# Patient Record
Sex: FEMALE | Race: WHITE | HISPANIC OR LATINO | URBAN - METROPOLITAN AREA
[De-identification: names, ages, dates, MRNs, and addresses within clinical notes are randomized per-mention and may not be internally consistent; named-entity substitution may affect disease eponyms.]

---

## 2017-10-11 ENCOUNTER — EMERGENCY (EMERGENCY)
Facility: HOSPITAL | Age: 27
LOS: 1 days | Discharge: HOME | End: 2017-10-11
Attending: EMERGENCY MEDICINE | Admitting: EMERGENCY MEDICINE
Payer: COMMERCIAL

## 2017-10-11 VITALS
HEART RATE: 67 BPM | SYSTOLIC BLOOD PRESSURE: 98 MMHG | OXYGEN SATURATION: 98 % | TEMPERATURE: 98 F | RESPIRATION RATE: 18 BRPM | DIASTOLIC BLOOD PRESSURE: 64 MMHG

## 2017-10-11 VITALS
HEART RATE: 84 BPM | RESPIRATION RATE: 20 BRPM | OXYGEN SATURATION: 99 % | DIASTOLIC BLOOD PRESSURE: 83 MMHG | TEMPERATURE: 99 F | SYSTOLIC BLOOD PRESSURE: 116 MMHG

## 2017-10-11 LAB
ALBUMIN SERPL ELPH-MCNC: 4.4 G/DL — SIGNIFICANT CHANGE UP (ref 3.3–5)
ALP SERPL-CCNC: 92 U/L — SIGNIFICANT CHANGE UP (ref 40–120)
ALT FLD-CCNC: 21 U/L RC — SIGNIFICANT CHANGE UP (ref 10–45)
ANION GAP SERPL CALC-SCNC: 15 MMOL/L — SIGNIFICANT CHANGE UP (ref 5–17)
APPEARANCE UR: CLEAR — SIGNIFICANT CHANGE UP
AST SERPL-CCNC: 19 U/L — SIGNIFICANT CHANGE UP (ref 10–40)
BASOPHILS # BLD AUTO: 0.1 K/UL — SIGNIFICANT CHANGE UP (ref 0–0.2)
BASOPHILS NFR BLD AUTO: 0.7 % — SIGNIFICANT CHANGE UP (ref 0–2)
BILIRUB SERPL-MCNC: 0.7 MG/DL — SIGNIFICANT CHANGE UP (ref 0.2–1.2)
BILIRUB UR-MCNC: NEGATIVE — SIGNIFICANT CHANGE UP
BUN SERPL-MCNC: 16 MG/DL — SIGNIFICANT CHANGE UP (ref 7–23)
CALCIUM SERPL-MCNC: 9.4 MG/DL — SIGNIFICANT CHANGE UP (ref 8.4–10.5)
CHLORIDE SERPL-SCNC: 104 MMOL/L — SIGNIFICANT CHANGE UP (ref 96–108)
CO2 SERPL-SCNC: 23 MMOL/L — SIGNIFICANT CHANGE UP (ref 22–31)
COLOR SPEC: YELLOW — SIGNIFICANT CHANGE UP
CREAT SERPL-MCNC: 0.76 MG/DL — SIGNIFICANT CHANGE UP (ref 0.5–1.3)
DIFF PNL FLD: ABNORMAL
EOSINOPHIL # BLD AUTO: 0.1 K/UL — SIGNIFICANT CHANGE UP (ref 0–0.5)
EOSINOPHIL NFR BLD AUTO: 1.2 % — SIGNIFICANT CHANGE UP (ref 0–6)
EPI CELLS # UR: SIGNIFICANT CHANGE UP /HPF
GLUCOSE SERPL-MCNC: 120 MG/DL — HIGH (ref 70–99)
GLUCOSE UR QL: NEGATIVE — SIGNIFICANT CHANGE UP
HCT VFR BLD CALC: 45 % — SIGNIFICANT CHANGE UP (ref 34.5–45)
HGB BLD-MCNC: 15.6 G/DL — HIGH (ref 11.5–15.5)
KETONES UR-MCNC: NEGATIVE — SIGNIFICANT CHANGE UP
LEUKOCYTE ESTERASE UR-ACNC: NEGATIVE — SIGNIFICANT CHANGE UP
LYMPHOCYTES # BLD AUTO: 1.6 K/UL — SIGNIFICANT CHANGE UP (ref 1–3.3)
LYMPHOCYTES # BLD AUTO: 14.2 % — SIGNIFICANT CHANGE UP (ref 13–44)
MCHC RBC-ENTMCNC: 32.8 PG — SIGNIFICANT CHANGE UP (ref 27–34)
MCHC RBC-ENTMCNC: 34.6 GM/DL — SIGNIFICANT CHANGE UP (ref 32–36)
MCV RBC AUTO: 94.9 FL — SIGNIFICANT CHANGE UP (ref 80–100)
MONOCYTES # BLD AUTO: 0.7 K/UL — SIGNIFICANT CHANGE UP (ref 0–0.9)
MONOCYTES NFR BLD AUTO: 6 % — SIGNIFICANT CHANGE UP (ref 2–14)
NEUTROPHILS # BLD AUTO: 8.9 K/UL — HIGH (ref 1.8–7.4)
NEUTROPHILS NFR BLD AUTO: 77.9 % — HIGH (ref 43–77)
NITRITE UR-MCNC: NEGATIVE — SIGNIFICANT CHANGE UP
PH UR: 6.5 — SIGNIFICANT CHANGE UP (ref 5–8)
PLATELET # BLD AUTO: 310 K/UL — SIGNIFICANT CHANGE UP (ref 150–400)
POTASSIUM SERPL-MCNC: 4.1 MMOL/L — SIGNIFICANT CHANGE UP (ref 3.5–5.3)
POTASSIUM SERPL-SCNC: 4.1 MMOL/L — SIGNIFICANT CHANGE UP (ref 3.5–5.3)
PROT SERPL-MCNC: 7.8 G/DL — SIGNIFICANT CHANGE UP (ref 6–8.3)
PROT UR-MCNC: NEGATIVE — SIGNIFICANT CHANGE UP
RBC # BLD: 4.75 M/UL — SIGNIFICANT CHANGE UP (ref 3.8–5.2)
RBC # FLD: 11.6 % — SIGNIFICANT CHANGE UP (ref 10.3–14.5)
RBC CASTS # UR COMP ASSIST: ABNORMAL /HPF (ref 0–2)
SODIUM SERPL-SCNC: 142 MMOL/L — SIGNIFICANT CHANGE UP (ref 135–145)
SP GR SPEC: 1.02 — SIGNIFICANT CHANGE UP (ref 1.01–1.02)
UROBILINOGEN FLD QL: NEGATIVE — SIGNIFICANT CHANGE UP
WBC # BLD: 11.5 K/UL — HIGH (ref 3.8–10.5)
WBC # FLD AUTO: 11.5 K/UL — HIGH (ref 3.8–10.5)
WBC UR QL: SIGNIFICANT CHANGE UP /HPF (ref 0–5)

## 2017-10-11 PROCEDURE — 76705 ECHO EXAM OF ABDOMEN: CPT

## 2017-10-11 PROCEDURE — 99284 EMERGENCY DEPT VISIT MOD MDM: CPT | Mod: 25

## 2017-10-11 PROCEDURE — 76705 ECHO EXAM OF ABDOMEN: CPT | Mod: 26

## 2017-10-11 PROCEDURE — 87086 URINE CULTURE/COLONY COUNT: CPT

## 2017-10-11 PROCEDURE — 81001 URINALYSIS AUTO W/SCOPE: CPT

## 2017-10-11 PROCEDURE — 99285 EMERGENCY DEPT VISIT HI MDM: CPT

## 2017-10-11 PROCEDURE — 80053 COMPREHEN METABOLIC PANEL: CPT

## 2017-10-11 PROCEDURE — 85027 COMPLETE CBC AUTOMATED: CPT

## 2017-10-11 RX ORDER — ACETAMINOPHEN 500 MG
1000 TABLET ORAL ONCE
Qty: 0 | Refills: 0 | Status: DISCONTINUED | OUTPATIENT
Start: 2017-10-11 | End: 2017-10-11

## 2017-10-11 RX ORDER — ONDANSETRON 8 MG/1
4 TABLET, FILM COATED ORAL ONCE
Qty: 0 | Refills: 0 | Status: COMPLETED | OUTPATIENT
Start: 2017-10-11 | End: 2017-10-11

## 2017-10-11 RX ORDER — OXYCODONE HYDROCHLORIDE 5 MG/1
1 TABLET ORAL
Qty: 12 | Refills: 0 | OUTPATIENT
Start: 2017-10-11 | End: 2017-10-14

## 2017-10-11 RX ORDER — OXYCODONE HYDROCHLORIDE 5 MG/1
5 TABLET ORAL ONCE
Qty: 0 | Refills: 0 | Status: DISCONTINUED | OUTPATIENT
Start: 2017-10-11 | End: 2017-10-11

## 2017-10-11 RX ORDER — SODIUM CHLORIDE 9 MG/ML
1000 INJECTION INTRAMUSCULAR; INTRAVENOUS; SUBCUTANEOUS ONCE
Qty: 0 | Refills: 0 | Status: COMPLETED | OUTPATIENT
Start: 2017-10-11 | End: 2017-10-11

## 2017-10-11 RX ORDER — TAMSULOSIN HYDROCHLORIDE 0.4 MG/1
1 CAPSULE ORAL
Qty: 14 | Refills: 0 | OUTPATIENT
Start: 2017-10-11 | End: 2017-10-25

## 2017-10-11 RX ORDER — ACETAMINOPHEN 500 MG
975 TABLET ORAL ONCE
Qty: 0 | Refills: 0 | Status: DISCONTINUED | OUTPATIENT
Start: 2017-10-11 | End: 2017-10-15

## 2017-10-11 RX ADMIN — SODIUM CHLORIDE 1000 MILLILITER(S): 9 INJECTION INTRAMUSCULAR; INTRAVENOUS; SUBCUTANEOUS at 12:42

## 2017-10-11 NOTE — ED PROVIDER NOTE - ATTENDING CONTRIBUTION TO CARE
Dr. Morrissey : I have personally seen and examined this patient at the bedside. I have fully participated in the care of this patient. I have reviewed all pertinent clinical information, including history, physical exam, plan and the Resident's note and agree except as noted.   26yo F hx of kidney stones, hypothyroidism p/w left flank pain, notes pain from left side radiating to left flank since yesterday + nausea. notes that feels the same when had kidney stones in the past . LMP mid of last month .  Denies f/c/v/cp/sob/palpitations/cough/abd.pain/d/c/dysuria/hematuria. no sick contacts/recent travel.    PE:  head; atraumatic normocephalic  eyes: perrla  Heart: rrr s1s2  lungs: ctab  abd: soft, nt nd + bs no rebound/guarding no cva ttp  le: no swelling no calf ttp  back: no midline cervical/thoracic/lumbar ttp    -->most likely kidney stone -->will fu labs, bedside sono; ua; analgesia--reassess

## 2017-10-11 NOTE — ED PROVIDER NOTE - OBJECTIVE STATEMENT
27 y.o. female hx of hypothyroidism, kidney stones p/w left flank pain. Patient states she developed pain in the left flank yesterday, intermittent and radiating to the left lower abdomen. She states she has had several kidney stones in the past, usually around this time of year and this pain feels similar to past kidney stones. Does report mild chills and nausea while she is having pain but denies fevers, vomiting, dysuria, hematuria, constipation/diarrhea, CP, SOB.

## 2017-10-11 NOTE — ED PROVIDER NOTE - CARE PLAN
Principal Discharge DX:	Kidney stone  Instructions for follow-up, activity and diet:	1. You may take Motrin 600mg every 6 hours as needed for pain. You may also take Oxycodone 5mg every 6 hours as needed for pain. DO NOT DRIVE after taking this medication as it can cause drowsiness.   2. Follow up with your Primary Care Physician and Urologist as soon as possible for further evaluation.   3. Return to the Emergency Department for any concerning symptoms.

## 2017-10-11 NOTE — ED PROVIDER NOTE - PLAN OF CARE
1. You may take Motrin 600mg every 6 hours as needed for pain. You may also take Oxycodone 5mg every 6 hours as needed for pain. DO NOT DRIVE after taking this medication as it can cause drowsiness.   2. Follow up with your Primary Care Physician and Urologist as soon as possible for further evaluation.   3. Return to the Emergency Department for any concerning symptoms.

## 2017-10-12 LAB
CULTURE RESULTS: SIGNIFICANT CHANGE UP
SPECIMEN SOURCE: SIGNIFICANT CHANGE UP

## 2017-12-13 ENCOUNTER — INPATIENT (INPATIENT)
Facility: HOSPITAL | Age: 27
LOS: 0 days | Discharge: HOME | DRG: 694 | End: 2017-12-14
Attending: FAMILY MEDICINE | Admitting: FAMILY MEDICINE
Payer: COMMERCIAL

## 2017-12-13 VITALS
WEIGHT: 164.91 LBS | DIASTOLIC BLOOD PRESSURE: 80 MMHG | RESPIRATION RATE: 16 BRPM | HEIGHT: 65 IN | TEMPERATURE: 98 F | OXYGEN SATURATION: 98 % | SYSTOLIC BLOOD PRESSURE: 119 MMHG | HEART RATE: 74 BPM

## 2017-12-13 LAB
ALBUMIN SERPL ELPH-MCNC: 3.6 G/DL — SIGNIFICANT CHANGE UP (ref 3.3–5)
ALP SERPL-CCNC: 88 U/L — SIGNIFICANT CHANGE UP (ref 40–120)
ALT FLD-CCNC: 23 U/L — SIGNIFICANT CHANGE UP (ref 12–78)
ANION GAP SERPL CALC-SCNC: 8 MMOL/L — SIGNIFICANT CHANGE UP (ref 5–17)
APPEARANCE UR: CLEAR — SIGNIFICANT CHANGE UP
AST SERPL-CCNC: 18 U/L — SIGNIFICANT CHANGE UP (ref 15–37)
BACTERIA # UR AUTO: ABNORMAL
BASOPHILS # BLD AUTO: 0.2 K/UL — SIGNIFICANT CHANGE UP (ref 0–0.2)
BASOPHILS NFR BLD AUTO: 0.9 % — SIGNIFICANT CHANGE UP (ref 0–2)
BILIRUB SERPL-MCNC: 0.9 MG/DL — SIGNIFICANT CHANGE UP (ref 0.2–1.2)
BILIRUB UR-MCNC: NEGATIVE — SIGNIFICANT CHANGE UP
BUN SERPL-MCNC: 17 MG/DL — SIGNIFICANT CHANGE UP (ref 7–23)
CALCIUM SERPL-MCNC: 8.8 MG/DL — SIGNIFICANT CHANGE UP (ref 8.5–10.1)
CHLORIDE SERPL-SCNC: 104 MMOL/L — SIGNIFICANT CHANGE UP (ref 96–108)
CO2 SERPL-SCNC: 26 MMOL/L — SIGNIFICANT CHANGE UP (ref 22–31)
COLOR SPEC: YELLOW — SIGNIFICANT CHANGE UP
CREAT SERPL-MCNC: 0.86 MG/DL — SIGNIFICANT CHANGE UP (ref 0.5–1.3)
DIFF PNL FLD: ABNORMAL
EOSINOPHIL # BLD AUTO: 0.2 K/UL — SIGNIFICANT CHANGE UP (ref 0–0.5)
EOSINOPHIL NFR BLD AUTO: 0.9 % — SIGNIFICANT CHANGE UP (ref 0–6)
EPI CELLS # UR: SIGNIFICANT CHANGE UP
GLUCOSE SERPL-MCNC: 97 MG/DL — SIGNIFICANT CHANGE UP (ref 70–99)
GLUCOSE UR QL: NEGATIVE MG/DL — SIGNIFICANT CHANGE UP
HCG SERPL-ACNC: <1 MIU/ML — SIGNIFICANT CHANGE UP
HCT VFR BLD CALC: 42.7 % — SIGNIFICANT CHANGE UP (ref 34.5–45)
HGB BLD-MCNC: 14.1 G/DL — SIGNIFICANT CHANGE UP (ref 11.5–15.5)
KETONES UR-MCNC: ABNORMAL
LACTATE SERPL-SCNC: 0.8 MMOL/L — SIGNIFICANT CHANGE UP (ref 0.7–2)
LEUKOCYTE ESTERASE UR-ACNC: ABNORMAL
LIDOCAIN IGE QN: 127 U/L — SIGNIFICANT CHANGE UP (ref 73–393)
LYMPHOCYTES # BLD AUTO: 11.8 % — LOW (ref 13–44)
LYMPHOCYTES # BLD AUTO: 2 K/UL — SIGNIFICANT CHANGE UP (ref 1–3.3)
MCHC RBC-ENTMCNC: 30.5 PG — SIGNIFICANT CHANGE UP (ref 27–34)
MCHC RBC-ENTMCNC: 33 GM/DL — SIGNIFICANT CHANGE UP (ref 32–36)
MCV RBC AUTO: 92.5 FL — SIGNIFICANT CHANGE UP (ref 80–100)
MONOCYTES # BLD AUTO: 1 K/UL — HIGH (ref 0–0.9)
MONOCYTES NFR BLD AUTO: 5.9 % — SIGNIFICANT CHANGE UP (ref 1–9)
NEUTROPHILS # BLD AUTO: 13.7 K/UL — HIGH (ref 1.8–7.4)
NEUTROPHILS NFR BLD AUTO: 80.4 % — HIGH (ref 43–77)
NITRITE UR-MCNC: NEGATIVE — SIGNIFICANT CHANGE UP
PH UR: 5 — SIGNIFICANT CHANGE UP (ref 5–8)
PLATELET # BLD AUTO: 314 K/UL — SIGNIFICANT CHANGE UP (ref 150–400)
POTASSIUM SERPL-MCNC: 3.4 MMOL/L — LOW (ref 3.5–5.3)
POTASSIUM SERPL-SCNC: 3.4 MMOL/L — LOW (ref 3.5–5.3)
PROT SERPL-MCNC: 7.4 G/DL — SIGNIFICANT CHANGE UP (ref 6–8.3)
PROT UR-MCNC: 30 MG/DL
RBC # BLD: 4.62 M/UL — SIGNIFICANT CHANGE UP (ref 3.8–5.2)
RBC # FLD: 11.7 % — SIGNIFICANT CHANGE UP (ref 10.3–14.5)
RBC CASTS # UR COMP ASSIST: >50 /HPF (ref 0–4)
SODIUM SERPL-SCNC: 138 MMOL/L — SIGNIFICANT CHANGE UP (ref 135–145)
SP GR SPEC: 1.02 — SIGNIFICANT CHANGE UP (ref 1.01–1.02)
UROBILINOGEN FLD QL: NEGATIVE MG/DL — SIGNIFICANT CHANGE UP
WBC # BLD: 17 K/UL — HIGH (ref 3.8–10.5)
WBC # FLD AUTO: 17 K/UL — HIGH (ref 3.8–10.5)
WBC UR QL: SIGNIFICANT CHANGE UP

## 2017-12-13 PROCEDURE — 74176 CT ABD & PELVIS W/O CONTRAST: CPT | Mod: 26

## 2017-12-13 RX ORDER — MORPHINE SULFATE 50 MG/1
4 CAPSULE, EXTENDED RELEASE ORAL ONCE
Qty: 0 | Refills: 0 | Status: DISCONTINUED | OUTPATIENT
Start: 2017-12-13 | End: 2017-12-13

## 2017-12-13 RX ORDER — ONDANSETRON 8 MG/1
4 TABLET, FILM COATED ORAL ONCE
Qty: 0 | Refills: 0 | Status: COMPLETED | OUTPATIENT
Start: 2017-12-13 | End: 2017-12-13

## 2017-12-13 RX ORDER — IOHEXOL 300 MG/ML
30 INJECTION, SOLUTION INTRAVENOUS ONCE
Qty: 0 | Refills: 0 | Status: DISCONTINUED | OUTPATIENT
Start: 2017-12-13 | End: 2017-12-13

## 2017-12-13 RX ORDER — LEVOTHYROXINE SODIUM 125 MCG
0 TABLET ORAL
Qty: 0 | Refills: 0 | COMMUNITY

## 2017-12-13 RX ORDER — SODIUM CHLORIDE 9 MG/ML
1000 INJECTION INTRAMUSCULAR; INTRAVENOUS; SUBCUTANEOUS
Qty: 0 | Refills: 0 | Status: COMPLETED | OUTPATIENT
Start: 2017-12-13 | End: 2017-12-13

## 2017-12-13 RX ORDER — SODIUM CHLORIDE 9 MG/ML
3 INJECTION INTRAMUSCULAR; INTRAVENOUS; SUBCUTANEOUS ONCE
Qty: 0 | Refills: 0 | Status: COMPLETED | OUTPATIENT
Start: 2017-12-13 | End: 2017-12-13

## 2017-12-13 RX ORDER — CEFTRIAXONE 500 MG/1
1 INJECTION, POWDER, FOR SOLUTION INTRAMUSCULAR; INTRAVENOUS ONCE
Qty: 0 | Refills: 0 | Status: COMPLETED | OUTPATIENT
Start: 2017-12-13 | End: 2017-12-13

## 2017-12-13 RX ADMIN — SODIUM CHLORIDE 1000 MILLILITER(S): 9 INJECTION INTRAMUSCULAR; INTRAVENOUS; SUBCUTANEOUS at 22:38

## 2017-12-13 RX ADMIN — ONDANSETRON 4 MILLIGRAM(S): 8 TABLET, FILM COATED ORAL at 20:35

## 2017-12-13 RX ADMIN — MORPHINE SULFATE 4 MILLIGRAM(S): 50 CAPSULE, EXTENDED RELEASE ORAL at 22:39

## 2017-12-13 RX ADMIN — SODIUM CHLORIDE 3 MILLILITER(S): 9 INJECTION INTRAMUSCULAR; INTRAVENOUS; SUBCUTANEOUS at 20:35

## 2017-12-13 RX ADMIN — ONDANSETRON 4 MILLIGRAM(S): 8 TABLET, FILM COATED ORAL at 22:38

## 2017-12-13 RX ADMIN — SODIUM CHLORIDE 1000 MILLILITER(S): 9 INJECTION INTRAMUSCULAR; INTRAVENOUS; SUBCUTANEOUS at 20:35

## 2017-12-13 RX ADMIN — MORPHINE SULFATE 4 MILLIGRAM(S): 50 CAPSULE, EXTENDED RELEASE ORAL at 20:35

## 2017-12-13 RX ADMIN — MORPHINE SULFATE 4 MILLIGRAM(S): 50 CAPSULE, EXTENDED RELEASE ORAL at 22:41

## 2017-12-13 NOTE — ED ADULT NURSE NOTE - CHIEF COMPLAINT QUOTE
pt stating left flank pain and groin with freq and urgency in uriantion, hx of kidney stones. pt recently diagnosed with UTI, on cipro pt stating left flank pain and groin with freq and urgency in urination, hx of kidney stones. pt recently diagnosed with UTI, on cipro

## 2017-12-13 NOTE — ED PROVIDER NOTE - PROGRESS NOTE DETAILS
Pt doing well, no acute co or changes. Pts pain improved. Scot Love, he reviewed case, will take to OR tonight for stone removal. Dw pt at length. Dr Roxanna macdonald ( LEXI Barrera) Scot Mcknight, will admit.

## 2017-12-13 NOTE — ED ADULT TRIAGE NOTE - CHIEF COMPLAINT QUOTE
pt stating left flank pain and groin with freq and urgency in uriantion, hx of kidney stones. pt recently diagnosed with UTI, on cipro

## 2017-12-13 NOTE — ED PROVIDER NOTE - OBJECTIVE STATEMENT
28 yo F p/w dysuria / painful urination x past ~ 1 week. Pt seen by her GYn yest and started on cipro. TOday with inc pain now rad to L back. No fever/chills. No cp/sob/palp. no vb / vag dc. No neck / back pain. No agg/allev factors. No recent trauma. NO other inj or co.

## 2017-12-14 VITALS — SYSTOLIC BLOOD PRESSURE: 108 MMHG | DIASTOLIC BLOOD PRESSURE: 58 MMHG

## 2017-12-14 DIAGNOSIS — N23 UNSPECIFIED RENAL COLIC: ICD-10-CM

## 2017-12-14 DIAGNOSIS — D72.829 ELEVATED WHITE BLOOD CELL COUNT, UNSPECIFIED: ICD-10-CM

## 2017-12-14 DIAGNOSIS — E03.9 HYPOTHYROIDISM, UNSPECIFIED: ICD-10-CM

## 2017-12-14 DIAGNOSIS — N13.2 HYDRONEPHROSIS WITH RENAL AND URETERAL CALCULOUS OBSTRUCTION: ICD-10-CM

## 2017-12-14 LAB
ANION GAP SERPL CALC-SCNC: 5 MMOL/L — SIGNIFICANT CHANGE UP (ref 5–17)
BLD GP AB SCN SERPL QL: SIGNIFICANT CHANGE UP
BUN SERPL-MCNC: 8 MG/DL — SIGNIFICANT CHANGE UP (ref 7–23)
CALCIUM SERPL-MCNC: 8 MG/DL — LOW (ref 8.5–10.1)
CHLORIDE SERPL-SCNC: 110 MMOL/L — HIGH (ref 96–108)
CO2 SERPL-SCNC: 26 MMOL/L — SIGNIFICANT CHANGE UP (ref 22–31)
CREAT SERPL-MCNC: 0.75 MG/DL — SIGNIFICANT CHANGE UP (ref 0.5–1.3)
CULTURE RESULTS: NO GROWTH — SIGNIFICANT CHANGE UP
GLUCOSE SERPL-MCNC: 97 MG/DL — SIGNIFICANT CHANGE UP (ref 70–99)
HCT VFR BLD CALC: 41.6 % — SIGNIFICANT CHANGE UP (ref 34.5–45)
HGB BLD-MCNC: 13.5 G/DL — SIGNIFICANT CHANGE UP (ref 11.5–15.5)
MCHC RBC-ENTMCNC: 30 PG — SIGNIFICANT CHANGE UP (ref 27–34)
MCHC RBC-ENTMCNC: 32.4 GM/DL — SIGNIFICANT CHANGE UP (ref 32–36)
MCV RBC AUTO: 92.6 FL — SIGNIFICANT CHANGE UP (ref 80–100)
PLATELET # BLD AUTO: 299 K/UL — SIGNIFICANT CHANGE UP (ref 150–400)
POTASSIUM SERPL-MCNC: 3.7 MMOL/L — SIGNIFICANT CHANGE UP (ref 3.5–5.3)
POTASSIUM SERPL-SCNC: 3.7 MMOL/L — SIGNIFICANT CHANGE UP (ref 3.5–5.3)
RBC # BLD: 4.49 M/UL — SIGNIFICANT CHANGE UP (ref 3.8–5.2)
RBC # FLD: 11.6 % — SIGNIFICANT CHANGE UP (ref 10.3–14.5)
SODIUM SERPL-SCNC: 141 MMOL/L — SIGNIFICANT CHANGE UP (ref 135–145)
SPECIMEN SOURCE: SIGNIFICANT CHANGE UP
WBC # BLD: 11.1 K/UL — HIGH (ref 3.8–10.5)
WBC # FLD AUTO: 11.1 K/UL — HIGH (ref 3.8–10.5)

## 2017-12-14 PROCEDURE — 83690 ASSAY OF LIPASE: CPT

## 2017-12-14 PROCEDURE — 96376 TX/PRO/DX INJ SAME DRUG ADON: CPT

## 2017-12-14 PROCEDURE — 36415 COLL VENOUS BLD VENIPUNCTURE: CPT

## 2017-12-14 PROCEDURE — 83605 ASSAY OF LACTIC ACID: CPT

## 2017-12-14 PROCEDURE — 81001 URINALYSIS AUTO W/SCOPE: CPT

## 2017-12-14 PROCEDURE — C1758: CPT

## 2017-12-14 PROCEDURE — 80048 BASIC METABOLIC PNL TOTAL CA: CPT

## 2017-12-14 PROCEDURE — 99285 EMERGENCY DEPT VISIT HI MDM: CPT | Mod: 25

## 2017-12-14 PROCEDURE — C2617: CPT

## 2017-12-14 PROCEDURE — 99285 EMERGENCY DEPT VISIT HI MDM: CPT

## 2017-12-14 PROCEDURE — 86900 BLOOD TYPING SEROLOGIC ABO: CPT

## 2017-12-14 PROCEDURE — 87086 URINE CULTURE/COLONY COUNT: CPT

## 2017-12-14 PROCEDURE — 86850 RBC ANTIBODY SCREEN: CPT

## 2017-12-14 PROCEDURE — 93010 ELECTROCARDIOGRAM REPORT: CPT

## 2017-12-14 PROCEDURE — 84702 CHORIONIC GONADOTROPIN TEST: CPT

## 2017-12-14 PROCEDURE — 96374 THER/PROPH/DIAG INJ IV PUSH: CPT

## 2017-12-14 PROCEDURE — 86901 BLOOD TYPING SEROLOGIC RH(D): CPT

## 2017-12-14 PROCEDURE — 85027 COMPLETE CBC AUTOMATED: CPT

## 2017-12-14 PROCEDURE — 74176 CT ABD & PELVIS W/O CONTRAST: CPT

## 2017-12-14 PROCEDURE — C1769: CPT

## 2017-12-14 PROCEDURE — 96375 TX/PRO/DX INJ NEW DRUG ADDON: CPT

## 2017-12-14 PROCEDURE — 76001: CPT

## 2017-12-14 PROCEDURE — 80053 COMPREHEN METABOLIC PANEL: CPT

## 2017-12-14 PROCEDURE — 93005 ELECTROCARDIOGRAM TRACING: CPT

## 2017-12-14 RX ORDER — ACETAMINOPHEN 500 MG
650 TABLET ORAL EVERY 6 HOURS
Qty: 0 | Refills: 0 | Status: DISCONTINUED | OUTPATIENT
Start: 2017-12-14 | End: 2017-12-14

## 2017-12-14 RX ORDER — PHENAZOPYRIDINE HCL 100 MG
1 TABLET ORAL
Qty: 6 | Refills: 0
Start: 2017-12-14 | End: 2017-12-15

## 2017-12-14 RX ORDER — CEFTRIAXONE 500 MG/1
1 INJECTION, POWDER, FOR SOLUTION INTRAMUSCULAR; INTRAVENOUS EVERY 24 HOURS
Qty: 0 | Refills: 0 | Status: DISCONTINUED | OUTPATIENT
Start: 2017-12-14 | End: 2017-12-14

## 2017-12-14 RX ORDER — OXYCODONE HYDROCHLORIDE 5 MG/1
5 TABLET ORAL EVERY 4 HOURS
Qty: 0 | Refills: 0 | Status: DISCONTINUED | OUTPATIENT
Start: 2017-12-14 | End: 2017-12-14

## 2017-12-14 RX ORDER — LEVOTHYROXINE SODIUM 125 MCG
125 TABLET ORAL DAILY
Qty: 0 | Refills: 0 | Status: DISCONTINUED | OUTPATIENT
Start: 2017-12-14 | End: 2017-12-14

## 2017-12-14 RX ORDER — ACETAMINOPHEN 500 MG
2 TABLET ORAL
Qty: 0 | Refills: 0 | DISCHARGE
Start: 2017-12-14

## 2017-12-14 RX ORDER — PHENAZOPYRIDINE HCL 100 MG
200 TABLET ORAL THREE TIMES A DAY
Qty: 0 | Refills: 0 | Status: DISCONTINUED | OUTPATIENT
Start: 2017-12-14 | End: 2017-12-14

## 2017-12-14 RX ORDER — HYDROMORPHONE HYDROCHLORIDE 2 MG/ML
0.5 INJECTION INTRAMUSCULAR; INTRAVENOUS; SUBCUTANEOUS
Qty: 0 | Refills: 0 | Status: DISCONTINUED | OUTPATIENT
Start: 2017-12-14 | End: 2017-12-14

## 2017-12-14 RX ORDER — OXYCODONE AND ACETAMINOPHEN 5; 325 MG/1; MG/1
1 TABLET ORAL EVERY 4 HOURS
Qty: 0 | Refills: 0 | Status: DISCONTINUED | OUTPATIENT
Start: 2017-12-14 | End: 2017-12-14

## 2017-12-14 RX ORDER — ONDANSETRON 8 MG/1
4 TABLET, FILM COATED ORAL EVERY 6 HOURS
Qty: 0 | Refills: 0 | Status: DISCONTINUED | OUTPATIENT
Start: 2017-12-14 | End: 2017-12-14

## 2017-12-14 RX ORDER — ACETAMINOPHEN 500 MG
650 TABLET ORAL ONCE
Qty: 0 | Refills: 0 | Status: DISCONTINUED | OUTPATIENT
Start: 2017-12-14 | End: 2017-12-14

## 2017-12-14 RX ORDER — CEFUROXIME AXETIL 250 MG
1 TABLET ORAL
Qty: 10 | Refills: 0
Start: 2017-12-14 | End: 2017-12-18

## 2017-12-14 RX ORDER — SODIUM CHLORIDE 9 MG/ML
1000 INJECTION INTRAMUSCULAR; INTRAVENOUS; SUBCUTANEOUS
Qty: 0 | Refills: 0 | Status: DISCONTINUED | OUTPATIENT
Start: 2017-12-14 | End: 2017-12-14

## 2017-12-14 RX ORDER — CIPROFLOXACIN LACTATE 400MG/40ML
0 VIAL (ML) INTRAVENOUS
Qty: 0 | Refills: 0 | COMMUNITY

## 2017-12-14 RX ADMIN — SODIUM CHLORIDE 100 MILLILITER(S): 9 INJECTION INTRAMUSCULAR; INTRAVENOUS; SUBCUTANEOUS at 01:45

## 2017-12-14 RX ADMIN — HYDROMORPHONE HYDROCHLORIDE 0.5 MILLIGRAM(S): 2 INJECTION INTRAMUSCULAR; INTRAVENOUS; SUBCUTANEOUS at 02:48

## 2017-12-14 RX ADMIN — OXYCODONE AND ACETAMINOPHEN 1 TABLET(S): 5; 325 TABLET ORAL at 10:19

## 2017-12-14 RX ADMIN — Medication 125 MICROGRAM(S): at 10:19

## 2017-12-14 RX ADMIN — Medication 200 MILLIGRAM(S): at 14:36

## 2017-12-14 RX ADMIN — HYDROMORPHONE HYDROCHLORIDE 0.5 MILLIGRAM(S): 2 INJECTION INTRAMUSCULAR; INTRAVENOUS; SUBCUTANEOUS at 02:38

## 2017-12-14 RX ADMIN — OXYCODONE AND ACETAMINOPHEN 1 TABLET(S): 5; 325 TABLET ORAL at 11:19

## 2017-12-14 RX ADMIN — SODIUM CHLORIDE 100 MILLILITER(S): 9 INJECTION INTRAMUSCULAR; INTRAVENOUS; SUBCUTANEOUS at 14:43

## 2017-12-14 RX ADMIN — CEFTRIAXONE 100 GRAM(S): 500 INJECTION, POWDER, FOR SOLUTION INTRAMUSCULAR; INTRAVENOUS at 16:22

## 2017-12-14 RX ADMIN — Medication 200 MILLIGRAM(S): at 05:40

## 2017-12-14 NOTE — DISCHARGE NOTE ADULT - CARE PLAN
Principal Discharge DX:	Ureteral colic  Goal:	free from pain  Instructions for follow-up, activity and diet:	follow up with urologist  Secondary Diagnosis:	Hypothyroid  Secondary Diagnosis:	Leukocytosis

## 2017-12-14 NOTE — BRIEF OPERATIVE NOTE - PROCEDURE
<<-----Click on this checkbox to enter Procedure Cystoscopic insertion of ureteral stent  12/14/2017    Active  NOAH

## 2017-12-14 NOTE — BRIEF OPERATIVE NOTE - PRE-OP DX
Acute cystitis without hematuria  12/14/2017    Active  Jose Love  Ureteral calculus, left  12/14/2017    Active  Jose Love

## 2017-12-14 NOTE — CONSULT NOTE ADULT - PROBLEM SELECTOR RECOMMENDATION 9
Pt has sxs of a distal stone x 1 week, and elevated wbc, despite 2 days of abiots. She thus requires an urgent stent placement. Procedure, probable risks, benefits and alternatives d/w pt and mother. They have asked questions, understood answers, and wish to proceed.

## 2017-12-14 NOTE — PROGRESS NOTE ADULT - SUBJECTIVE AND OBJECTIVE BOX
INTERVAL HPI/OVERNIGHT EVENTS:Patient feels much better. C/o mild left flank pain from stent.    MEDICATIONS  (STANDING):  cefTRIAXone   IVPB 1 Gram(s) IV Intermittent every 24 hours  phenazopyridine 200 milliGRAM(s) Oral three times a day  sodium chloride 0.9%. 1000 milliLiter(s) (100 mL/Hr) IV Continuous <Continuous>    MEDICATIONS  (PRN):  acetaminophen   Tablet 650 milliGRAM(s) Oral every 6 hours PRN For Temp greater than 38 C (100.4 F)  acetaminophen   Tablet. 650 milliGRAM(s) Oral every 6 hours PRN Mild Pain (1 - 3)  ondansetron Injectable 4 milliGRAM(s) IV Push every 6 hours PRN Nausea and/or Vomiting  oxyCODONE    5 mG/acetaminophen 325 mG 1 Tablet(s) Oral every 4 hours PRN Moderate Pain (4 - 6)        Vital Signs Last 24 Hrs  T(C): 36.6 (14 Dec 2017 03:35), Max: 37.3 (14 Dec 2017 00:19)  T(F): 97.9 (14 Dec 2017 03:35), Max: 99.1 (14 Dec 2017 00:19)  HR: 72 (14 Dec 2017 03:35) (62 - 90)  BP: 110/73 (14 Dec 2017 03:35) (107/64 - 123/84)  BP(mean): --  RR: 14 (14 Dec 2017 03:35) (12 - 21)  SpO2: 96% (14 Dec 2017 03:35) (95% - 99%)    PHYSICAL EXAM:    ABDOMEN: Soft, No CVA or SP tenderness    LABS:                        14.1   17.0  )-----------( 314      ( 13 Dec 2017 20:41 )             42.7         138  |  104  |  17  ----------------------------<  97  3.4<L>   |  26  |  0.86    Ca    8.8      13 Dec 2017 20:41    TPro  7.4  /  Alb  3.6  /  TBili  0.9  /  DBili  x   /  AST  18  /  ALT  23  /  AlkPhos  88  12-      Urinalysis Basic - ( 13 Dec 2017 20:41 )    Color: Yellow / Appearance: Clear / S.020 / pH: x  Gluc: x / Ketone: Moderate  / Bili: Negative / Urobili: Negative mg/dL   Blood: x / Protein: 30 mg/dL / Nitrite: Negative   Leuk Esterase: Trace / RBC: >50 /HPF / WBC 3-5   Sq Epi: x / Non Sq Epi: Few / Bacteria: Occasional

## 2017-12-14 NOTE — DISCHARGE NOTE ADULT - CARE PROVIDERS DIRECT ADDRESSES
[email or web address]

## 2017-12-14 NOTE — DISCHARGE NOTE ADULT - MEDICATION SUMMARY - MEDICATIONS TO TAKE
I will START or STAY ON the medications listed below when I get home from the hospital:    acetaminophen 325 mg oral tablet  -- 2 tab(s) by mouth every 6 hours, As needed, Mild Pain (1 - 3)  -- Indication: For pain    Ceftin 250 mg oral tablet  -- 1 tab(s) by mouth every 12 hours   -- Finish all this medication unless otherwise directed by prescriber.  Medication should be taken with plenty of water.  Take with food or milk.    -- Indication: For prevent uti    phenazopyridine 200 mg oral tablet  -- 1 tab(s) by mouth 3 times a day  -- Indication: For dysuria    Synthroid 125 mcg (0.125 mg) oral tablet  -- 1 tab(s) by mouth once a day  -- Indication: For hypothyroid

## 2017-12-14 NOTE — DISCHARGE NOTE ADULT - CARE PROVIDER_API CALL
Martin Burnett), Internal Medicine  [address] [place] [address]  Phone: [phone]  Fax: [phone]    Anjel Grimm), Urology  [address] [facility]  [address] [place] [address]  Phone: [phone]  Fax: [phone]

## 2017-12-14 NOTE — DISCHARGE NOTE ADULT - PATIENT PORTAL LINK FT
“You can access the FollowHealth Patient Portal, offered by [facility], by registering with the following website: [email or web address]”

## 2017-12-14 NOTE — ED ADULT NURSE REASSESSMENT NOTE - NS ED NURSE REASSESS COMMENT FT1
Pt going to OR for infected kidney stone- report given patient left for OR via stretcher in stable condition

## 2017-12-14 NOTE — H&P ADULT - HISTORY OF PRESENT ILLNESS
This is a 26 YO Female brought to ER because of dysuria and painful urination x past ~ 1 week. Patient seen by her GYN and started on Cipro.  TOday with increased pain now  Lower back. No fever or chills. This is a 28 YO Female brought to ER because of dysuria and painful urination x past ~ 1 week. Patient seen by her GYN and started on Cipro.  Today with increased pain and lower back. No fever or chills.

## 2017-12-14 NOTE — CONSULT NOTE ADULT - SUBJECTIVE AND OBJECTIVE BOX
CHIEF COMPLAINT: left flank pain, urinary frequency    HISTORY OF PRESENT ILLNESS: 26 y/o woman with hx of stones has a 1 week hx of frequency and urgency. She saw gyn yesterday and started abiot yesterday. She felt warm at home and developed abdominal pain and came to ED.  She was seen in ED 10/17 with colic, a sono showed left hydro, but pt did not f/u.  CT shows 3mm distal stone, left hydro, and pt has WBC 17K. + Nausea, no vommitting    PAST MEDICAL & SURGICAL HISTORY:  Hypothyroid  Hypothyroid  Gastritis  No significant past surgical history  No significant past surgical history      REVIEW OF SYSTEMS:    CONSTITUTIONAL: No weakness, fevers or chills  EYES/ENT: No visual changes;  No vertigo or throat pain   NECK: No pain or stiffness  RESPIRATORY: No cough, wheezing, hemoptysis; No shortness of breath  CARDIOVASCULAR: No chest pain or palpitations  GASTROINTESTINAL: See HPI.   GENITOURINARY: +dysuria, +frequency or no hematuria  NEUROLOGICAL: No numbness or weakness  SKIN: No itching, burning, rashes, or lesions   All other review of systems is negative unless indicated above.    MEDICATIONS  (STANDING):  cefTRIAXone   IVPB 1 Gram(s) IV Intermittent Once    MEDICATIONS  (PRN):      Allergies    No Known Allergies    Intolerances        SOCIAL HISTORY:    FAMILY HISTORY:  No pertinent family history in first degree relatives      Vital Signs Last 24 Hrs  T(C): 36.7 (13 Dec 2017 22:20), Max: 36.7 (13 Dec 2017 22:20)  T(F): 98 (13 Dec 2017 22:20), Max: 98 (13 Dec 2017 22:20)  HR: 83 (13 Dec 2017 22:20) (74 - 83)  BP: 122/75 (13 Dec 2017 22:20) (119/80 - 122/75)  BP(mean): --  RR: 14 (13 Dec 2017 22:20) (14 - 16)  SpO2: 99% (13 Dec 2017 22:20) (98% - 99%)    PHYSICAL EXAM:    Constitutional: NAD, well-developed  HEENT: CHARLIE, EOMI, Normal Hearing, MMM  Neck: No LAD, No JVD  Back: Normal spine flexure, No CVA tenderness  Respiratory: not using accessory muscles  Cardiovascular: RRR  Abd: BS+, soft, NT/ND, No CVAT  Extremities: No peripheral edema  Vascular: 2+ peripheral pulses  Neurological: A/O x 3, no focal deficits  Psychiatric: Normal mood, normal affect  Musculoskeletal: 5/5 strength b/l upper and lower extremities  Skin: No rashes    LABS:                        14.1   17.0  )-----------( 314      ( 13 Dec 2017 20:41 )             42.7         138  |  104  |  17  ----------------------------<  97  3.4<L>   |  26  |  0.86    Ca    8.8      13 Dec 2017 20:41    TPro  7.4  /  Alb  3.6  /  TBili  0.9  /  DBili  x   /  AST  18  /  ALT  23  /  AlkPhos  88        Urinalysis Basic - ( 13 Dec 2017 20:41 )    Color: Yellow / Appearance: Clear / S.020 / pH: x  Gluc: x / Ketone: Moderate  / Bili: Negative / Urobili: Negative mg/dL   Blood: x / Protein: 30 mg/dL / Nitrite: Negative   Leuk Esterase: Trace / RBC: >50 /HPF / WBC 3-5   Sq Epi: x / Non Sq Epi: Few / Bacteria: Occasional      Urine Culture: pending    RADIOLOGY & ADDITIONAL STUDIES:  EXAM:  CT RENAL STONE HUNT                            PROCEDURE DATE:  2017          INTERPRETATION:  CT ABDOMEN AND PELVIS WITHOUT CONTRAST    INDICATION: Left lower quadrant and left flank pain.    TECHNIQUE: Abdominopelvic CT without intravenous contrast.    COMPARISON: CT 2016.    FINDINGS:    ABSENCE OF INTRAVENOUS CONTRAST LIMITS EVALUATION FOR FOCAL LESIONS,   NEOPLASM, AND VASCULAR PATHOLOGY.    Lower Thorax: No consolidation or effusion.        Liver: No suspicious lesions.    Biliary: No dilatation.      Spleen: No suspicious lesions.      Pancreas: No inflammatory changes or ductal dilatation.      Adrenals: Normal.      Kidneys: No right hydronephrosis or solid mass. Moderate left   hydronephrosis secondary to a 3 mm calculus at the left ureterovesical   junction.  Vessels: Normal caliber.        GI tract: No evidence of small bowel obstruction. No wall thickening or   inflammatory changes.        Peritoneum/retroperitoneum and mesentery: No free air. No organized fluid   collection. No adenopathy.        Pelvic organs/Bladder: No pelvic masses. Bladder is normal.        Abdominal wall: Unremarkable.  Bones and soft tissues: No destructive lesion.        IMPRESSION:    Moderate left hydronephrosis secondary to a 3 mm calculus at the left   ureterovesical junction.                SOCORRO BUENROSTRO M.D., ATTENDING RADIOLOGIST  This document has been electronically signed. Dec 13 2017 10:23PM

## 2017-12-14 NOTE — H&P ADULT - NSHPLABSRESULTS_GEN_ALL_CORE
14.1   17.0  )-----------( 314      ( 13 Dec 2017 20:41 )             42.7     13 Dec 2017 20:41    138    |  104    |  17     ----------------------------<  97     3.4     |  26     |  0.86     Ca    8.8        13 Dec 2017 20:41    TPro  7.4    /  Alb  3.6    /  TBili  0.9    /  DBili  x      /  AST  18     /  ALT  23     /  AlkPhos  88     13 Dec 2017 20:41    LIVER FUNCTIONS - ( 13 Dec 2017 20:41 )  Alb: 3.6 g/dL / Pro: 7.4 g/dL / ALK PHOS: 88 U/L / ALT: 23 U/L / AST: 18 U/L / GGT: x             CAPILLARY BLOOD GLUCOSE            Urinalysis Basic - ( 13 Dec 2017 20:41 )    Color: Yellow / Appearance: Clear / S.020 / pH: x  Gluc: x / Ketone: Moderate  / Bili: Negative / Urobili: Negative mg/dL   Blood: x / Protein: 30 mg/dL / Nitrite: Negative   Leuk Esterase: Trace / RBC: >50 /HPF / WBC 3-5   Sq Epi: x / Non Sq Epi: Few / Bacteria: Occasional

## 2017-12-16 LAB
CULTURE RESULTS: SIGNIFICANT CHANGE UP
SPECIMEN SOURCE: SIGNIFICANT CHANGE UP

## 2018-06-14 NOTE — ED ADULT NURSE NOTE - OBJECTIVE STATEMENT
27 y.o. female presents ambulatory to ED through waiting room c/o L sided flank pain. History of hypothyroid & kidney stones, was unable to make appointment with urologist. describes pain as intermittent, comes in waves, endorses chills with increased pain, nausea but no vomiting. Pain started yesterday, Left sided abdominal pain radiating to Left flank & left lower back. Took tylenol last night and had relief of pain, was able to sleep. Went to work this morning and pain came back. Took motrin aprox 1130am. Denies HA, CP, SOB, diarrhea, dysuria, hematuria, fevers. Patient undressed and placed into gown, call bell in hand and side rails up with bed in lowest position for safety. blanket provided, vital signs stable. Comfort and safety provided. No

## 2019-07-12 NOTE — ED ADULT NURSE NOTE - BREATHING, MLM
General Sunscreen Counseling: I recommended a broad spectrum sunscreen with a SPF of 50 or higher.  I explained that SPF 50 sunscreens block approximately 97 percent of the sun's harmful rays.  Sunscreens should be applied at least 15 minutes prior to expected sun exposure and then every 2 hours after that as long as sun exposure continues. If swimming or exercising sunscreen should be reapplied every 45 minutes to an hour after getting wet or sweating.  One ounce, or the equivalent of a shot glass full of sunscreen, is adequate to protect the skin not covered by a bathing suit. I also recommended a lip balm with a sunscreen as well. Sun protective clothing can be used in lieu of sunscreen but must be worn the entire time you are exposed to the sun's rays. Detail Level: Generalized Products Recommended: Hats, sun protective clothing, Sunscreen and reapply sunscreen. LiotaMd, Sallie Smithm Spontaneous, unlabored and symmetrical

## 2020-01-20 NOTE — ED ADULT NURSE NOTE - CAS EDN DISCHARGE ASSESSMENT
PLEASE CALL PATIENT: Conor Salamanca,       Your recent test results are attached, if you have any questions or concerns please feel free to contact me via e-mail or call [phone].  Thyroid normal. Cholesterol looks great. Sodium and potassium normal. Blood sugar (glucose) normal.  Creatinine and GFR normal, which means kidney function is normal.  Liver enzymes normal.     For your irregular period concerns, please schedule with our OBGYN Judy at your earliest convenience.     For your weight concerns, I would continue to exercise and monitor caloric intake. Over time I would expect you to start losing some of that weight you gained.       Sincerely,  Patricia Yip PA-C    
Alert and oriented to person, place and time

## 2020-10-27 NOTE — ED ADULT NURSE NOTE - ED STAT RN HANDOFF DETAILS 2
Melinda Jim is a 38year old male. Patient presents with:  ER F/U      HPI:   Patient seen evaluation he has been emergency room on 10/19 after being hit in the head at work from a heavy chain that fell.   He suffered a laceration to his scalp which was XII intact, nonfocal exam  ASSESSMENT AND PLAN:     Laceration of scalp, subsequent encounter  (primary encounter diagnosis)  Encounter for staple removal  No orders of the defined types were placed in this encounter.     Meds & Refills for this Visit:  Ronald Azul report received from LIZY Otto

## 2021-06-25 NOTE — DISCHARGE NOTE ADULT - HOSPITAL COURSE
none
admitted for renal colic  found to have ureteral stone  underwent ureteral stent placement  Dc after uro clearance

## 2022-03-29 ENCOUNTER — EMERGENCY (EMERGENCY)
Facility: HOSPITAL | Age: 32
LOS: 1 days | Discharge: HOME | End: 2022-03-29
Attending: EMERGENCY MEDICINE | Admitting: EMERGENCY MEDICINE
Payer: COMMERCIAL

## 2022-03-29 VITALS
DIASTOLIC BLOOD PRESSURE: 86 MMHG | SYSTOLIC BLOOD PRESSURE: 134 MMHG | HEIGHT: 65 IN | TEMPERATURE: 98 F | OXYGEN SATURATION: 98 % | RESPIRATION RATE: 17 BRPM | WEIGHT: 179.02 LBS | HEART RATE: 82 BPM

## 2022-03-29 VITALS
DIASTOLIC BLOOD PRESSURE: 69 MMHG | SYSTOLIC BLOOD PRESSURE: 103 MMHG | RESPIRATION RATE: 18 BRPM | OXYGEN SATURATION: 96 % | TEMPERATURE: 98 F | HEART RATE: 71 BPM

## 2022-03-29 PROBLEM — E03.9 HYPOTHYROIDISM, UNSPECIFIED: Chronic | Status: ACTIVE | Noted: 2017-10-11

## 2022-03-29 LAB
ALBUMIN SERPL ELPH-MCNC: 3.7 G/DL — SIGNIFICANT CHANGE UP (ref 3.3–5)
ALP SERPL-CCNC: 105 U/L — SIGNIFICANT CHANGE UP (ref 30–120)
ALT FLD-CCNC: 26 U/L DA — SIGNIFICANT CHANGE UP (ref 10–60)
ANION GAP SERPL CALC-SCNC: 11 MMOL/L — SIGNIFICANT CHANGE UP (ref 5–17)
APPEARANCE UR: CLEAR — SIGNIFICANT CHANGE UP
AST SERPL-CCNC: 18 U/L — SIGNIFICANT CHANGE UP (ref 10–40)
BACTERIA # UR AUTO: ABNORMAL
BASOPHILS # BLD AUTO: 0.03 K/UL — SIGNIFICANT CHANGE UP (ref 0–0.2)
BASOPHILS NFR BLD AUTO: 0.3 % — SIGNIFICANT CHANGE UP (ref 0–2)
BILIRUB SERPL-MCNC: 0.9 MG/DL — SIGNIFICANT CHANGE UP (ref 0.2–1.2)
BILIRUB UR-MCNC: NEGATIVE — SIGNIFICANT CHANGE UP
BUN SERPL-MCNC: 10 MG/DL — SIGNIFICANT CHANGE UP (ref 7–23)
CALCIUM SERPL-MCNC: 8.7 MG/DL — SIGNIFICANT CHANGE UP (ref 8.4–10.5)
CHLORIDE SERPL-SCNC: 102 MMOL/L — SIGNIFICANT CHANGE UP (ref 96–108)
CO2 SERPL-SCNC: 21 MMOL/L — LOW (ref 22–31)
COLOR SPEC: YELLOW — SIGNIFICANT CHANGE UP
CREAT SERPL-MCNC: 0.67 MG/DL — SIGNIFICANT CHANGE UP (ref 0.5–1.3)
DIFF PNL FLD: ABNORMAL
EGFR: 118 ML/MIN/1.73M2 — SIGNIFICANT CHANGE UP
EOSINOPHIL # BLD AUTO: 0.15 K/UL — SIGNIFICANT CHANGE UP (ref 0–0.5)
EOSINOPHIL NFR BLD AUTO: 1.6 % — SIGNIFICANT CHANGE UP (ref 0–6)
EPI CELLS # UR: SIGNIFICANT CHANGE UP
GLUCOSE SERPL-MCNC: 101 MG/DL — HIGH (ref 70–99)
GLUCOSE UR QL: NEGATIVE MG/DL — SIGNIFICANT CHANGE UP
HCG UR QL: NEGATIVE — SIGNIFICANT CHANGE UP
HCT VFR BLD CALC: 47.1 % — HIGH (ref 34.5–45)
HGB BLD-MCNC: 16.2 G/DL — HIGH (ref 11.5–15.5)
IMM GRANULOCYTES NFR BLD AUTO: 0.3 % — SIGNIFICANT CHANGE UP (ref 0–1.5)
KETONES UR-MCNC: ABNORMAL
LEUKOCYTE ESTERASE UR-ACNC: NEGATIVE — SIGNIFICANT CHANGE UP
LIDOCAIN IGE QN: 90 U/L — SIGNIFICANT CHANGE UP (ref 73–393)
LYMPHOCYTES # BLD AUTO: 1.3 K/UL — SIGNIFICANT CHANGE UP (ref 1–3.3)
LYMPHOCYTES # BLD AUTO: 13.9 % — SIGNIFICANT CHANGE UP (ref 13–44)
MCHC RBC-ENTMCNC: 30.9 PG — SIGNIFICANT CHANGE UP (ref 27–34)
MCHC RBC-ENTMCNC: 34.4 GM/DL — SIGNIFICANT CHANGE UP (ref 32–36)
MCV RBC AUTO: 89.7 FL — SIGNIFICANT CHANGE UP (ref 80–100)
MONOCYTES # BLD AUTO: 0.63 K/UL — SIGNIFICANT CHANGE UP (ref 0–0.9)
MONOCYTES NFR BLD AUTO: 6.7 % — SIGNIFICANT CHANGE UP (ref 2–14)
NEUTROPHILS # BLD AUTO: 7.21 K/UL — SIGNIFICANT CHANGE UP (ref 1.8–7.4)
NEUTROPHILS NFR BLD AUTO: 77.2 % — HIGH (ref 43–77)
NITRITE UR-MCNC: NEGATIVE — SIGNIFICANT CHANGE UP
NRBC # BLD: 0 /100 WBCS — SIGNIFICANT CHANGE UP (ref 0–0)
PH UR: 5 — SIGNIFICANT CHANGE UP (ref 5–8)
PLATELET # BLD AUTO: 297 K/UL — SIGNIFICANT CHANGE UP (ref 150–400)
POTASSIUM SERPL-MCNC: 3.7 MMOL/L — SIGNIFICANT CHANGE UP (ref 3.5–5.3)
POTASSIUM SERPL-SCNC: 3.7 MMOL/L — SIGNIFICANT CHANGE UP (ref 3.5–5.3)
PROT SERPL-MCNC: 7.9 G/DL — SIGNIFICANT CHANGE UP (ref 6–8.3)
PROT UR-MCNC: 15 MG/DL
RBC # BLD: 5.25 M/UL — HIGH (ref 3.8–5.2)
RBC # FLD: 12.6 % — SIGNIFICANT CHANGE UP (ref 10.3–14.5)
RBC CASTS # UR COMP ASSIST: ABNORMAL /HPF (ref 0–4)
SODIUM SERPL-SCNC: 134 MMOL/L — LOW (ref 135–145)
SP GR SPEC: 1.02 — SIGNIFICANT CHANGE UP (ref 1.01–1.02)
UROBILINOGEN FLD QL: NEGATIVE MG/DL — SIGNIFICANT CHANGE UP
WBC # BLD: 9.35 K/UL — SIGNIFICANT CHANGE UP (ref 3.8–10.5)
WBC # FLD AUTO: 9.35 K/UL — SIGNIFICANT CHANGE UP (ref 3.8–10.5)
WBC UR QL: SIGNIFICANT CHANGE UP

## 2022-03-29 PROCEDURE — 96375 TX/PRO/DX INJ NEW DRUG ADDON: CPT

## 2022-03-29 PROCEDURE — 36415 COLL VENOUS BLD VENIPUNCTURE: CPT

## 2022-03-29 PROCEDURE — 85025 COMPLETE CBC W/AUTO DIFF WBC: CPT

## 2022-03-29 PROCEDURE — 99284 EMERGENCY DEPT VISIT MOD MDM: CPT

## 2022-03-29 PROCEDURE — 99284 EMERGENCY DEPT VISIT MOD MDM: CPT | Mod: 25

## 2022-03-29 PROCEDURE — 80053 COMPREHEN METABOLIC PANEL: CPT

## 2022-03-29 PROCEDURE — 96374 THER/PROPH/DIAG INJ IV PUSH: CPT

## 2022-03-29 PROCEDURE — 81001 URINALYSIS AUTO W/SCOPE: CPT

## 2022-03-29 PROCEDURE — 81025 URINE PREGNANCY TEST: CPT

## 2022-03-29 PROCEDURE — 83690 ASSAY OF LIPASE: CPT

## 2022-03-29 RX ORDER — SODIUM CHLORIDE 9 MG/ML
1000 INJECTION INTRAMUSCULAR; INTRAVENOUS; SUBCUTANEOUS ONCE
Refills: 0 | Status: COMPLETED | OUTPATIENT
Start: 2022-03-29 | End: 2022-03-29

## 2022-03-29 RX ORDER — PANTOPRAZOLE SODIUM 20 MG/1
40 TABLET, DELAYED RELEASE ORAL ONCE
Refills: 0 | Status: COMPLETED | OUTPATIENT
Start: 2022-03-29 | End: 2022-03-29

## 2022-03-29 RX ORDER — ONDANSETRON 8 MG/1
4 TABLET, FILM COATED ORAL ONCE
Refills: 0 | Status: COMPLETED | OUTPATIENT
Start: 2022-03-29 | End: 2022-03-29

## 2022-03-29 RX ORDER — KETOROLAC TROMETHAMINE 30 MG/ML
30 SYRINGE (ML) INJECTION ONCE
Refills: 0 | Status: DISCONTINUED | OUTPATIENT
Start: 2022-03-29 | End: 2022-03-29

## 2022-03-29 RX ORDER — FAMOTIDINE 10 MG/ML
0 INJECTION INTRAVENOUS
Qty: 0 | Refills: 0 | DISCHARGE

## 2022-03-29 RX ORDER — SUCRALFATE 1 G
1 TABLET ORAL ONCE
Refills: 0 | Status: COMPLETED | OUTPATIENT
Start: 2022-03-29 | End: 2022-03-29

## 2022-03-29 RX ORDER — OMEPRAZOLE 10 MG/1
1 CAPSULE, DELAYED RELEASE ORAL
Qty: 14 | Refills: 0
Start: 2022-03-29 | End: 2022-04-11

## 2022-03-29 RX ORDER — ONDANSETRON 8 MG/1
1 TABLET, FILM COATED ORAL
Qty: 12 | Refills: 0
Start: 2022-03-29 | End: 2022-03-31

## 2022-03-29 RX ADMIN — ONDANSETRON 4 MILLIGRAM(S): 8 TABLET, FILM COATED ORAL at 12:21

## 2022-03-29 RX ADMIN — Medication 30 MILLIGRAM(S): at 12:41

## 2022-03-29 RX ADMIN — Medication 30 MILLIGRAM(S): at 12:21

## 2022-03-29 RX ADMIN — SODIUM CHLORIDE 1000 MILLILITER(S): 9 INJECTION INTRAMUSCULAR; INTRAVENOUS; SUBCUTANEOUS at 12:21

## 2022-03-29 RX ADMIN — SODIUM CHLORIDE 1000 MILLILITER(S): 9 INJECTION INTRAMUSCULAR; INTRAVENOUS; SUBCUTANEOUS at 13:34

## 2022-03-29 RX ADMIN — Medication 1 GRAM(S): at 12:21

## 2022-03-29 RX ADMIN — PANTOPRAZOLE SODIUM 40 MILLIGRAM(S): 20 TABLET, DELAYED RELEASE ORAL at 12:21

## 2022-03-29 NOTE — ED PROVIDER NOTE - PATIENT PORTAL LINK FT
You can access the FollowMyHealth Patient Portal offered by [facility] by registering at the following website: [email or web address]. By joining [employer]’s FollowMyHealth portal, you will also be able to view your health information using other applications (apps) compatible with our system.

## 2022-03-29 NOTE — ED ADULT NURSE NOTE - NSFALLRSKASSESSTYPE_ED_ALL_ED
Detail Level: Detailed
Quality 110: Preventive Care And Screening: Influenza Immunization: Influenza Immunization previously received during influenza season
Quality 130: Documentation Of Current Medications In The Medical Record: Current Medications Documented
Quality 226: Preventive Care And Screening: Tobacco Use: Screening And Cessation Intervention: Patient screened for tobacco use and is an ex/non-smoker
Quality 431: Preventive Care And Screening: Unhealthy Alcohol Use - Screening: Patient screened for unhealthy alcohol use using a single question and scores less than 2 times per year
Quality 131: Pain Assessment And Follow-Up: Pain assessment using a standardized tool is documented as negative, no follow-up plan required
Initial (On Arrival)

## 2022-03-29 NOTE — ED PROVIDER NOTE - CLINICAL SUMMARY MEDICAL DECISION MAKING FREE TEXT BOX
30 yo F with hx of hypothyroid, co NV and diarrhea for several days. Other family members have been sick with a stomach virus. Denies fever, Cough, SOB, hematemesis, melena, dysuria, hematuria or pregnancy.    VSS Afebrile, NAD  HEENT - clear  PERRL EOMI  Neck supple  lungs clear  Cor S1S2 RR - MGR  Abd soft nontender, no mass or HSM, no rebound, no CVA tenderness  Ext FROM intact, no edema  Neuro Intact, no deficits.  Skin Warm and dry no rash.    Imp gastroenteritis  Plan - labs, IVF, reassess.    I performed a history and physical exam of the patient and discussed their management with the advanced care provider. I reviewed the advanced care provider's note and agree with the documented findings and plan of care. My medical decision making and objective findings are found above.

## 2022-03-29 NOTE — ED PROVIDER NOTE - CARE PROVIDER_API CALL
Guicho Guzman ()  Internal Medicine  [address] [place] [address]  Phone: [phone]  Fax: [phone]  Follow Up Time: 1-3 Days

## 2022-03-29 NOTE — ED PROVIDER NOTE - PROGRESS NOTE DETAILS
Reevaluated patient at bedside.  Patient feeling much improved. nausea and pain resolved. no abd tenderness on repeat exam. taking po fluids. referral to GI provided if symptoms continue.  Discussed the results of all diagnostic testing in ED and copies of all reports given.   An opportunity to ask questions was given.  Discussed the importance of prompt, close medical follow-up.  Patient will return with any changes, concerns or persistent / worsening symptoms.  Understanding of all instructions verbalized.

## 2022-03-29 NOTE — ED PROVIDER NOTE - NS ED ATTENDING STATEMENT MOD
This was a shared visit with the SYLVIA. I reviewed and verified the documentation and independently performed the documented:

## 2022-03-29 NOTE — ED ADULT NURSE NOTE - OBJECTIVE STATEMENT
31 YOF A&OX3 with pmh of kidney stones, gastritis, hypothyroidism presents to ED with abdominal pain. pt states has had epigastric pain, n/v/d for past 2 days. pt states has had over 10 episodes of diarrhea and vomited "a few times today". 31 YOF A&OX3 with pmh of kidney stones, gastritis, hypothyroidism presents to ED with abdominal pain. pt states has had epigastric pain, n/v/d for past 2 days. pt states has had over 10 episodes of diarrhea and vomited "a few times today". pt denies sob, chest pain, headaches, dizziness, fevers/chills, blurry vision. safety maintained.

## 2022-03-29 NOTE — ED PROVIDER NOTE - OBJECTIVE STATEMENT
31 year old female with history of hypothyroid and gastritis presents with intermittent epigastric pain with n/v/d for past 2 days. reports 5-10 episodes of diarrhea 2 days ago, 5-10 yesterday, none today. vomited a few times yesterday, no vomiting today. no chest pain or shortness of breath. no current flank pain. no urinary complaints. no previous abd surgeries. no foreign ravels. no fevers. reports family members with similar symptoms last week but did not last as long   PCP Braden

## 2022-03-29 NOTE — ED PROVIDER NOTE - NSFOLLOWUPINSTRUCTIONS_ED_ALL_ED_FT
clear liquid diet next 8-12 hours, advance to BRAT diet as tolerated  zofran every 6 hours for nausea   bentyl every 6-8 hours for abdominal cramping   omeprazole once a day  follow up with GI doctor if symptoms continue, referral provided       Nausea and Vomiting, Adult      Nausea is feeling sick to your stomach or feeling that you are about to throw up (vomit). Vomiting is when food in your stomach is thrown up and out of the mouth. Throwing up can make you feel weak. It can also make you lose too much water in your body (get dehydrated). If you lose too much water in your body, you may:  •Feel tired.       •Feel thirsty.       •Have a dry mouth.      •Have cracked lips.      •Go pee (urinate) less often.      Older adults and people with other diseases or a weak body defense system (immune system) are at higher risk for losing too much water in the body. If you feel sick to your stomach and you throw up, it is important to follow instructions from your doctor about how to take care of yourself.      Follow these instructions at home:    Watch your symptoms for any changes. Tell your doctor about them. Follow these instructions to care for yourself at home.      Eating and drinking                   •Take an ORS (oral rehydration solution). This is a drink that is sold at pharmacies and stores.    •Drink clear fluids in small amounts as you are able, such as:  •Water.      •Ice chips.      •Fruit juice that has water added (diluted fruit juice).      •Low-calorie sports drinks.      •Eat bland, easy-to-digest foods in small amounts as you are able, such as:  •Bananas.      •Applesauce.      •Rice.      •Low-fat (lean) meats.      •Toast.      •Crackers.        •Avoid drinking fluids that have a lot of sugar or caffeine in them. This includes energy drinks, sports drinks, and soda.      •Avoid alcohol.      •Avoid spicy or fatty foods.      General instructions     •Take over-the-counter and prescription medicines only as told by your doctor.      •Drink enough fluid to keep your pee (urine) pale yellow.      •Wash your hands often with soap and water. If you cannot use soap and water, use hand .      •Make sure that all people in your home wash their hands well and often.      •Rest at home while you get better.      •Watch your condition for any changes.      •Take slow and deep breaths when you feel sick to your stomach.      •Keep all follow-up visits as told by your doctor. This is important.        Contact a doctor if:    •Your symptoms get worse.      •You have new symptoms.      •You have a fever.      •You cannot drink fluids without throwing up.      •You feel sick to your stomach for more than 2 days.      •You feel light-headed or dizzy.      •You have a headache.      •You have muscle cramps.      •You have a rash.      •You have pain while peeing.        Get help right away if:    •You have pain in your chest, neck, arm, or jaw.      •You feel very weak or you pass out (faint).      •You throw up again and again.      •You have throw up that is bright red or looks like black coffee grounds.      •You have bloody or black poop (stools) or poop that looks like tar.      •You have a very bad headache, a stiff neck, or both.      •You have very bad pain, cramping, or bloating in your belly (abdomen).      •You have trouble breathing.      •You are breathing very quickly.      •Your heart is beating very quickly.      •Your skin feels cold and clammy.      •You feel confused.    •You have signs of losing too much water in your body, such as:  •Dark pee, very little pee, or no pee.      •Cracked lips.      •Dry mouth.      •Sunken eyes.      •Sleepiness.      •Weakness.        These symptoms may be an emergency. Do not wait to see if the symptoms will go away. Get medical help right away. Call your local emergency services (911 in the [place] Do not drive yourself to the hospital.       Summary    •Nausea is feeling sick to your stomach or feeling that you are about to throw up (vomit). Vomiting is when food in your stomach is thrown up and out of the mouth.      •Follow instructions from your doctor about eating and drinking to keep from losing too much water in your body.      •Take over-the-counter and prescription medicines only as told by your doctor.      •Contact your doctor if your symptoms get worse or you have new symptoms.      •Keep all follow-up visits as told by your doctor. This is important.      This information is not intended to replace advice given to you by your health care provider. Make sure you discuss any questions you have with your health care provider.

## 2022-07-20 NOTE — PATIENT PROFILE ADULT. - PRO INTERPRETER NEED 2
Inform about results EKG, chest -xray & labs  ----- Message from ANNA Jack sent at 7/14/2022 10:56 AM EDT -----  Thyroid, kidney, liver function all normal.  Blood count normal though WBC slightly elevated.  Looks like has been elevated for at least the last 3 years.  Hemoglobin a1c is slightly higher than when it was last checked.  All ok for surgery.       English

## 2022-11-30 ENCOUNTER — EMERGENCY (EMERGENCY)
Facility: HOSPITAL | Age: 32
LOS: 1 days | Discharge: HOME | End: 2022-11-30
Attending: INTERNAL MEDICINE | Admitting: INTERNAL MEDICINE
Payer: COMMERCIAL

## 2022-11-30 VITALS
TEMPERATURE: 98 F | DIASTOLIC BLOOD PRESSURE: 84 MMHG | HEIGHT: 65 IN | SYSTOLIC BLOOD PRESSURE: 131 MMHG | OXYGEN SATURATION: 98 % | HEART RATE: 104 BPM | RESPIRATION RATE: 18 BRPM | WEIGHT: 179.9 LBS

## 2022-11-30 LAB
ALBUMIN SERPL ELPH-MCNC: 3.6 G/DL — SIGNIFICANT CHANGE UP (ref 3.3–5)
ALP SERPL-CCNC: 102 U/L — SIGNIFICANT CHANGE UP (ref 40–120)
ALT FLD-CCNC: 26 U/L — SIGNIFICANT CHANGE UP (ref 12–78)
ANION GAP SERPL CALC-SCNC: 8 MMOL/L — SIGNIFICANT CHANGE UP (ref 5–17)
APPEARANCE UR: CLEAR — SIGNIFICANT CHANGE UP
AST SERPL-CCNC: 18 U/L — SIGNIFICANT CHANGE UP (ref 15–37)
BILIRUB SERPL-MCNC: 0.7 MG/DL — SIGNIFICANT CHANGE UP (ref 0.2–1.2)
BILIRUB UR-MCNC: NEGATIVE — SIGNIFICANT CHANGE UP
BUN SERPL-MCNC: 13 MG/DL — SIGNIFICANT CHANGE UP (ref 7–23)
CALCIUM SERPL-MCNC: 9.3 MG/DL — SIGNIFICANT CHANGE UP (ref 8.5–10.1)
CHLORIDE SERPL-SCNC: 107 MMOL/L — SIGNIFICANT CHANGE UP (ref 96–108)
CO2 SERPL-SCNC: 24 MMOL/L — SIGNIFICANT CHANGE UP (ref 22–31)
COLOR SPEC: YELLOW — SIGNIFICANT CHANGE UP
CREAT SERPL-MCNC: 0.96 MG/DL — SIGNIFICANT CHANGE UP (ref 0.5–1.3)
DIFF PNL FLD: ABNORMAL
EGFR: 80 ML/MIN/1.73M2 — SIGNIFICANT CHANGE UP
EPI CELLS # UR: SIGNIFICANT CHANGE UP
GLUCOSE SERPL-MCNC: 116 MG/DL — HIGH (ref 70–99)
GLUCOSE UR QL: NEGATIVE — SIGNIFICANT CHANGE UP
HCG SERPL-ACNC: <1 MIU/ML — SIGNIFICANT CHANGE UP
HCT VFR BLD CALC: 43.3 % — SIGNIFICANT CHANGE UP (ref 34.5–45)
HGB BLD-MCNC: 14.9 G/DL — SIGNIFICANT CHANGE UP (ref 11.5–15.5)
KETONES UR-MCNC: NEGATIVE — SIGNIFICANT CHANGE UP
LEUKOCYTE ESTERASE UR-ACNC: NEGATIVE — SIGNIFICANT CHANGE UP
MCHC RBC-ENTMCNC: 30.5 PG — SIGNIFICANT CHANGE UP (ref 27–34)
MCHC RBC-ENTMCNC: 34.4 GM/DL — SIGNIFICANT CHANGE UP (ref 32–36)
MCV RBC AUTO: 88.5 FL — SIGNIFICANT CHANGE UP (ref 80–100)
NITRITE UR-MCNC: NEGATIVE — SIGNIFICANT CHANGE UP
NRBC # BLD: 0 /100 WBCS — SIGNIFICANT CHANGE UP (ref 0–0)
PH UR: 6.5 — SIGNIFICANT CHANGE UP (ref 5–8)
PLATELET # BLD AUTO: 349 K/UL — SIGNIFICANT CHANGE UP (ref 150–400)
POTASSIUM SERPL-MCNC: 3.7 MMOL/L — SIGNIFICANT CHANGE UP (ref 3.5–5.3)
POTASSIUM SERPL-SCNC: 3.7 MMOL/L — SIGNIFICANT CHANGE UP (ref 3.5–5.3)
PROT SERPL-MCNC: 7.2 G/DL — SIGNIFICANT CHANGE UP (ref 6–8.3)
PROT UR-MCNC: NEGATIVE — SIGNIFICANT CHANGE UP
RBC # BLD: 4.89 M/UL — SIGNIFICANT CHANGE UP (ref 3.8–5.2)
RBC # FLD: 12.6 % — SIGNIFICANT CHANGE UP (ref 10.3–14.5)
RBC CASTS # UR COMP ASSIST: ABNORMAL /HPF (ref 0–4)
SODIUM SERPL-SCNC: 139 MMOL/L — SIGNIFICANT CHANGE UP (ref 135–145)
SP GR SPEC: 1.01 — SIGNIFICANT CHANGE UP (ref 1.01–1.02)
UROBILINOGEN FLD QL: NEGATIVE — SIGNIFICANT CHANGE UP
WBC # BLD: 13.92 K/UL — HIGH (ref 3.8–10.5)
WBC # FLD AUTO: 13.92 K/UL — HIGH (ref 3.8–10.5)
WBC UR QL: SIGNIFICANT CHANGE UP

## 2022-11-30 PROCEDURE — 85027 COMPLETE CBC AUTOMATED: CPT

## 2022-11-30 PROCEDURE — 99284 EMERGENCY DEPT VISIT MOD MDM: CPT | Mod: 25

## 2022-11-30 PROCEDURE — 84702 CHORIONIC GONADOTROPIN TEST: CPT

## 2022-11-30 PROCEDURE — 99283 EMERGENCY DEPT VISIT LOW MDM: CPT

## 2022-11-30 PROCEDURE — 81001 URINALYSIS AUTO W/SCOPE: CPT

## 2022-11-30 PROCEDURE — 70450 CT HEAD/BRAIN W/O DYE: CPT | Mod: 26,MA

## 2022-11-30 PROCEDURE — 80053 COMPREHEN METABOLIC PANEL: CPT

## 2022-11-30 PROCEDURE — 70450 CT HEAD/BRAIN W/O DYE: CPT | Mod: MA

## 2022-11-30 PROCEDURE — 96375 TX/PRO/DX INJ NEW DRUG ADDON: CPT

## 2022-11-30 PROCEDURE — 36415 COLL VENOUS BLD VENIPUNCTURE: CPT

## 2022-11-30 PROCEDURE — 87637 SARSCOV2&INF A&B&RSV AMP PRB: CPT

## 2022-11-30 PROCEDURE — 96374 THER/PROPH/DIAG INJ IV PUSH: CPT

## 2022-11-30 RX ORDER — ACETAMINOPHEN 500 MG
1000 TABLET ORAL ONCE
Refills: 0 | Status: COMPLETED | OUTPATIENT
Start: 2022-11-30 | End: 2022-11-30

## 2022-11-30 RX ORDER — SODIUM CHLORIDE 9 MG/ML
1000 INJECTION INTRAMUSCULAR; INTRAVENOUS; SUBCUTANEOUS ONCE
Refills: 0 | Status: COMPLETED | OUTPATIENT
Start: 2022-11-30 | End: 2022-11-30

## 2022-11-30 RX ORDER — PANTOPRAZOLE SODIUM 20 MG/1
40 TABLET, DELAYED RELEASE ORAL ONCE
Refills: 0 | Status: COMPLETED | OUTPATIENT
Start: 2022-11-30 | End: 2022-11-30

## 2022-11-30 RX ADMIN — PANTOPRAZOLE SODIUM 40 MILLIGRAM(S): 20 TABLET, DELAYED RELEASE ORAL at 22:22

## 2022-11-30 RX ADMIN — Medication 400 MILLIGRAM(S): at 22:22

## 2022-11-30 RX ADMIN — SODIUM CHLORIDE 1000 MILLILITER(S): 9 INJECTION INTRAMUSCULAR; INTRAVENOUS; SUBCUTANEOUS at 22:22

## 2022-11-30 NOTE — ED PROVIDER NOTE - CLINICAL SUMMARY MEDICAL DECISION MAKING FREE TEXT BOX
Mild headache, gastritis...  labs and CT normal, improved with IV Tylenol and Protonix, stable on discharge with O/P referral given

## 2022-11-30 NOTE — ED PROVIDER NOTE - OBJECTIVE STATEMENT
Patient received awake, alert, and orientated. No distress noted. Vital signs stable.  No complaints of pain. No obvious signs or symptoms of injury. Patient complains of dizziness, numbess to right hand, headache and stomach discomfort.  multiple medical complaints, dizziness, numbness 33 y/o female C/C Patient received awake, alert, and orientated. No distress noted. Vital signs stable.  No complaints of pain. No obvious signs or symptoms of injury. Patient complains of dizziness, numbness to right hand, headache and stomach discomfort.  no fever, no rash no toxemia, , no chest pain, no SOB, no palpitations, no n/v/d, no urinary symptoms,  no focal  neuro changes.

## 2022-11-30 NOTE — ED PROVIDER NOTE - SIGNIFICANT NEGATIVE FINDINGS
no fever, no rash no toxemia, , no chest pain, no SOB, no palpitations, no n/v/d, no urinary symptoms,  no focal  neuro changes.

## 2022-11-30 NOTE — ED ADULT TRIAGE NOTE - CHIEF COMPLAINT QUOTE
Patient received awake, alert, and orientated. No distress noted. Vital signs stable.  No complaints of pain. No obvious signs or symptoms of injury. Patient complains of dizziness, numbess to right hand, headache and stomach discomfort.

## 2022-11-30 NOTE — ED PROVIDER NOTE - PATIENT PORTAL LINK FT
You can access the FollowMyHealth Patient Portal offered by [facility] by registering at the following website: [email or web address]. By joining [employer]’s FollowMyHealth portal, you will also be able to view your health information using other applications (apps) compatible with our system.

## 2022-11-30 NOTE — ED PROVIDER NOTE - CARE PROVIDER_API CALL
Ada Cardona  NEUROLOGY  [address] [place] [address]  Phone: [phone]  Fax: [phone]  Follow Up Time: 1-3 Days

## 2022-12-01 LAB
FLUAV AG NPH QL: SIGNIFICANT CHANGE UP
FLUBV AG NPH QL: SIGNIFICANT CHANGE UP
RSV RNA NPH QL NAA+NON-PROBE: SIGNIFICANT CHANGE UP
SARS-COV-2 RNA SPEC QL NAA+PROBE: SIGNIFICANT CHANGE UP

## 2023-10-17 NOTE — ED ADULT NURSE NOTE - GENITOURINARY WDL
RECOVERY RESOURCES    Ginger Gavin MS, [facility]  Certified   Certified Family   [phone]    [place] [id number] [facility].  [address] [place] [address], [address] [place] [address]  [phone] Bladder non-tender and non-distended. Urine clear yellow.

## 2024-11-27 NOTE — ED PROVIDER NOTE - EYES, MLM
Addended by: THIERNO CASANOVA on: 11/27/2024 12:54 PM     Modules accepted: Orders     Clear bilaterally

## 2025-03-05 NOTE — BRIEF OPERATIVE NOTE - OPERATION/FINDINGS
Received a message from [employer] manager that a message popped up at the  when checking in that an ABN needed to be signed and patient declined.  The PET techs were able to see that it was approved, but the patient was not reachable in time to get him back for the scan.  Patient is ok to reschedule.      Contacted scheduling, rescheduled PET for 3/28/2025 with a 10:45 AM check in.    [employer] message to patient informing of rescheduled imaging and provider visits.    Evelia Marquez RN     marked left hydronephrosis